# Patient Record
(demographics unavailable — no encounter records)

---

## 2024-10-24 NOTE — PROCEDURE
[IUD Removal] : intrauterine device (IUD) removal [Risks] : risks [Benefits] : benefits [Alternatives] : alternatives [Patient] : patient [Speculum Placed] : speculum placed [IUD Removed - Forceps] : IUD removed - forceps [IUD Discarded] : IUD discarded [Tolerated Well] : Patient tolerated the procedure well [No Complications] : no complications [___ Day(s)] : in [unfilled] day(s) [de-identified] : VALENTINE

## 2024-10-24 NOTE — HISTORY OF PRESENT ILLNESS
[Y] : Patient is sexually active [Irregular Menstrual Interval] : irregular menstrual interval [Abnormal Duration] : abnormal duration [___ Days] : [unfilled] days [Staining] : staining [No] : Patient does not have concerns regarding sex [Currently Active] : currently active [Patient would like to be screened for STIs] : Patient would like to be screened for STIs [FreeTextEntry1] : 35yo P1 LMP earlier this month presents to clinic complaining of irregular menstrual cycles. Pt reports having Mirena IUD since last year and mentions that since the IUD her menstrual cycles have been irregular and can last for 2-3 weeks of vaginal spotting. Denies any pain, cramping, HA, CP, SOB, dysuria, or light headedness.   Denies any PMH  [Patient declined mammogram] : Patient declined mammogram [Patient declined breast sonogram] : Patient declined breast sonogram [Patient declined bone density test] : Patient declined bone density test [Patient declined colonoscopy] : Patient declined colonoscopy [TextBox_31] : 2023 [TextBox_53] : ordered today [TextBox_58] : ordered today [TextBox_63] : ordered today [TextBox_68] : ordered today [TextBox_73] : ordered today [TextBox_83] : ordered today [TextBox_88] : ordered today [LMPDate] : 10/-1/24 [MensesFreq] : 18 [PGxTotal] : 1 [facility] : [facility]

## 2024-10-24 NOTE — HISTORY OF PRESENT ILLNESS
[Y] : Patient is sexually active [Irregular Menstrual Interval] : irregular menstrual interval [Abnormal Duration] : abnormal duration [___ Days] : [unfilled] days [Staining] : staining [No] : Patient does not have concerns regarding sex [Currently Active] : currently active [Patient would like to be screened for STIs] : Patient would like to be screened for STIs [FreeTextEntry1] : 35yo P1 LMP earlier this month presents to clinic complaining of irregular menstrual cycles. Pt reports having Mirena IUD since last year and mentions that since the IUD her menstrual cycles have been irregular and can last for 2-3 weeks of vaginal spotting. Denies any pain, cramping, HA, CP, SOB, dysuria, or light headedness.   Denies any PMH  [Patient declined mammogram] : Patient declined mammogram [Patient declined breast sonogram] : Patient declined breast sonogram [Patient declined bone density test] : Patient declined bone density test [Patient declined colonoscopy] : Patient declined colonoscopy [TextBox_31] : 2023 [TextBox_53] : ordered today [TextBox_58] : ordered today [TextBox_63] : ordered today [TextBox_68] : ordered today [TextBox_73] : ordered today [TextBox_83] : ordered today [TextBox_88] : ordered today [LMPDate] : 10/-1/24 [MensesFreq] : 18 [PGxTotal] : 1 [facility] : [facility]

## 2024-10-24 NOTE — HISTORY OF PRESENT ILLNESS
[Y] : Patient is sexually active [Irregular Menstrual Interval] : irregular menstrual interval [Abnormal Duration] : abnormal duration [___ Days] : [unfilled] days [Staining] : staining [No] : Patient does not have concerns regarding sex [Currently Active] : currently active [Patient would like to be screened for STIs] : Patient would like to be screened for STIs [FreeTextEntry1] : 35yo P1 LMP earlier this month presents to clinic complaining of irregular menstrual cycles. Pt reports having Mirena IUD since last year and mentions that since the IUD her menstrual cycles have been irregular and can last for 2-3 weeks of vaginal spotting. Denies any pain, cramping, HA, CP, SOB, dysuria, or light headedness.   Denies any PMH  [Patient declined mammogram] : Patient declined mammogram [Patient declined breast sonogram] : Patient declined breast sonogram [Patient declined bone density test] : Patient declined bone density test [Patient declined colonoscopy] : Patient declined colonoscopy [TextBox_31] : 2023 [TextBox_53] : ordered today [TextBox_58] : ordered today [TextBox_63] : ordered today [TextBox_68] : ordered today [TextBox_73] : ordered today [TextBox_83] : ordered today [TextBox_88] : ordered today [LMPDate] : 10/-1/24 [MensesFreq] : 18 [PGxTotal] : 1 [facility]

## 2024-10-24 NOTE — PLAN
[FreeTextEntry1] : 33yo LMP 10/1 presents for IUD removal and annual  #contraception planning  - IUD removed today with no issues - Depot shot given today as this was her choice of preference  #HCM - Gardasil shot given today  - STD testing ordered - RTC in 3 months for next Depot and Gardasil 2

## 2024-10-24 NOTE — PROCEDURE
[IUD Removal] : intrauterine device (IUD) removal [Risks] : risks [Benefits] : benefits [Alternatives] : alternatives [Patient] : patient [Speculum Placed] : speculum placed [IUD Removed - Forceps] : IUD removed - forceps [IUD Discarded] : IUD discarded [Tolerated Well] : Patient tolerated the procedure well [No Complications] : no complications [___ Day(s)] : in [unfilled] day(s) [de-identified] : VALENTINE

## 2024-10-24 NOTE — PROCEDURE
[IUD Removal] : intrauterine device (IUD) removal [Risks] : risks [Benefits] : benefits [Alternatives] : alternatives [Patient] : patient [Speculum Placed] : speculum placed [IUD Removed - Forceps] : IUD removed - forceps [IUD Discarded] : IUD discarded [Tolerated Well] : Patient tolerated the procedure well [No Complications] : no complications [___ Day(s)] : in [unfilled] day(s) [de-identified] : VALENTINE

## 2024-10-24 NOTE — PLAN
[FreeTextEntry1] : 35yo LMP 10/1 presents for IUD removal and annual  #contraception planning  - IUD removed today with no issues - Depot shot given today as this was her choice of preference  #HCM - Gardasil shot given today  - STD testing ordered - RTC in 3 months for next Depot and Gardasil 2

## 2024-10-24 NOTE — COUNSELING
[Breast Self Exam] : breast self exam [Pregnancy Options] : pregnancy options [STD (testing, results, tx)] : STD (testing, results, tx) [Vaccines] : vaccines [HPV Vaccine] : HPV Vaccine

## 2024-10-24 NOTE — PHYSICAL EXAM
[Chaperone Present] : A chaperone was present in the examining room during all aspects of the physical examination [Appropriately responsive] : appropriately responsive [Alert] : alert [No Acute Distress] : no acute distress [No Lymphadenopathy] : no lymphadenopathy [Regular Rate Rhythm] : regular rate rhythm [No Murmurs] : no murmurs [Clear to Auscultation B/L] : clear to auscultation bilaterally [Soft] : soft [Non-tender] : non-tender [Non-distended] : non-distended [No HSM] : No HSM [No Lesions] : no lesions [No Mass] : no mass [Oriented x3] : oriented x3 [Labia Majora] : normal [Labia Minora] : normal [Uterine Adnexae] : normal [Examination Of The Breasts] : a normal appearance [Normal] : normal [No Masses] : no breast masses were palpable